# Patient Record
Sex: FEMALE | Race: WHITE | Employment: OTHER | URBAN - METROPOLITAN AREA
[De-identification: names, ages, dates, MRNs, and addresses within clinical notes are randomized per-mention and may not be internally consistent; named-entity substitution may affect disease eponyms.]

---

## 2018-06-04 PROBLEM — N95.9 POSTMENOPAUSAL SYMPTOMS: Status: ACTIVE | Noted: 2018-06-04

## 2018-06-05 PROBLEM — R73.01 IFG (IMPAIRED FASTING GLUCOSE): Status: ACTIVE | Noted: 2018-06-05

## 2022-06-04 ENCOUNTER — APPOINTMENT (OUTPATIENT)
Dept: GENERAL RADIOLOGY | Facility: CLINIC | Age: 77
End: 2022-06-04
Payer: MEDICARE

## 2022-06-04 ENCOUNTER — HOSPITAL ENCOUNTER (EMERGENCY)
Facility: CLINIC | Age: 77
Discharge: HOME | End: 2022-06-04
Attending: EMERGENCY MEDICINE
Payer: MEDICARE

## 2022-06-04 VITALS
DIASTOLIC BLOOD PRESSURE: 98 MMHG | RESPIRATION RATE: 16 BRPM | WEIGHT: 250 LBS | SYSTOLIC BLOOD PRESSURE: 179 MMHG | HEIGHT: 63 IN | BODY MASS INDEX: 44.3 KG/M2 | HEART RATE: 70 BPM | OXYGEN SATURATION: 94 % | TEMPERATURE: 98.5 F

## 2022-06-04 DIAGNOSIS — M79.604 RIGHT LEG PAIN: Primary | ICD-10-CM

## 2022-06-04 DIAGNOSIS — M25.561 ACUTE PAIN OF RIGHT KNEE: ICD-10-CM

## 2022-06-04 PROCEDURE — 73562 X-RAY EXAM OF KNEE 3: CPT

## 2022-06-04 PROCEDURE — 99283 EMERGENCY DEPT VISIT LOW MDM: CPT

## 2022-06-04 PROCEDURE — 73502 X-RAY EXAM HIP UNI 2-3 VIEWS: CPT

## 2022-06-04 ASSESSMENT — PAIN DESCRIPTION - ONSET: ONSET: ON-GOING

## 2022-06-04 ASSESSMENT — PAIN - FUNCTIONAL ASSESSMENT: PAIN_FUNCTIONAL_ASSESSMENT: 0-10

## 2022-06-04 ASSESSMENT — PAIN DESCRIPTION - FREQUENCY: FREQUENCY: CONTINUOUS

## 2022-06-04 NOTE — ED NOTES
Patient to ED via self wheeled to room 12  Here for complaint of right knee pain radiating up to right hip  Patient states it has been bothering her for about a week now since she was sitting on a folding chair.  Patient states she stood up out of a chair today and felt a pop in her right knee which is what brought her in  Denies any other injury  Denies CP, SOB, or N/V    Vitals obtained and call light provided  Patient resting comfortably on stretcher in no apparent distress  Respirations even and non-labored  Lacie León NP at bedside to evaluate patient     Raymond Rm RN  06/04/22 0817

## 2022-06-04 NOTE — ED PROVIDER NOTES
[facility] ED  [address] [facility]  Phone: [phone]      eMERGENCY dEPARTMENT eNCOUnter      Pt Name: Gloria Candelaria  MRN: [id number]  [place] 1945  Date of evaluation: 6/4/22      CHIEF COMPLAINT:  Chief Complaint   Patient presents with    Knee Pain    Leg Pain    Hip Pain     HISTORY OF PRESENT ILLNESS    lGoria Candelaria is a 68 y.o. female who presents emergency room today with complaints of right leg pain. She reports that she has been having intermittent right leg pain since last Saturday. She denies specific injury although states that she was at a party sitting in a folding chair that was on a slant and she feels as if she had to brace herself with using her right leg. She states that she remembers having right knee pain after getting up from the chair. She states that the pain radiates from her right knee up into her right thigh and right hip and down into her right anterior lower leg. She states she has been using Salonpas patches, acetaminophen and applying ice with minimal relief. She denies any paresthesias or focal weakness. She denies any right calf tenderness. Nursing Notes were reviewed. REVIEW OF SYSTEMS       Constitutional: Denies recent fever, chills. Eyes: No vision changes. Neck: No neck pain. Respiratory: Denies recent shortness of breath. Cardiac:  Denies recent chest pain. GI:  Denies abdominal pain/nausea/vomiting/diarrhea. : Denies dysuria. Musculoskeletal: Complaining of right lower extremity pain  Neurologic:  No headache. No focal weakness. No paresthesias. Skin:  Denies any rash. Negative in 10 essential Systems except as mentioned above and in the HPI. PAST MEDICAL HISTORY   PMH:  has a past medical history of Hypertension and Sleep apnea. Surgical History:  has a past surgical history that includes Hysterectomy (2006) and Cholecystectomy (2006). Social History:  reports that she has never smoked.  She has never used smokeless tobacco. She reports that she does not drink alcohol and does not use drugs. Family History: None  Psychiatric History: None    Allergies:is allergic to ace inhibitors and codeine. PHYSICAL EXAM     INITIAL VITALS: BP (!) 179/98   Pulse 70   Temp 98.5 °F (36.9 °C) (Oral)   Resp 16   Ht 5' 3\" (1.6 m)   Wt 113.4 kg (250 lb)   SpO2 94%   BMI 44.29 kg/m²   Constitutional:  Well developed   Eyes:  Pupils equal/round  HENT:  Atraumatic, external ears normal, nose normal  Respiratory:  LCTA bilat, no W/R/R  Cardiovascular:  RRR with normal S1 and S2    Musculoskeletal: No gross deformity/ecchymosis/erythema or bony point tenderness, range of motion intact, DP/PT pulses intact, NV intact distally. No signs of acute limb ischemia. Back:  No midline or CVA tenderness. Normal to inspection. Integument:   No rash. Neurologic:  Alert & appropriate mentation/interaction, no focal deficits noted       DIAGNOSTIC RESULTS     EKG: All EKG's are interpreted by the Emergency Department Physician who either signs or Co-signs this chart in the absence of a cardiologist.  Not indicated    RADIOLOGY:   Reviewed the radiologist:  XR KNEE RIGHT (3 VIEWS)   Final Result   No acute osseous abnormality. XR HIP RIGHT (2-3 VIEWS)   Final Result   No acute osseous abnormality. XR HIP RIGHT (2-3 VIEWS)    Result Date: 6/4/2022  EXAMINATION: TWO XRAY VIEWS OF THE RIGHT HIP; THREE XRAY VIEWS OF THE RIGHT KNEE 6/4/2022 11:03 am COMPARISON: None. HISTORY: ORDERING SYSTEM PROVIDED HISTORY: right hip pain TECHNOLOGIST PROVIDED HISTORY: right hip pain Reason for Exam: Pt.  States she has had pain to right knee extending up to right hip at times x 1 week. Today when she stood up she felt a \"pop\" in right knee and has increased pain.   No known injury.  ; ORDERING SYSTEM PROVIDED HISTORY: right knee pain TECHNOLOGIST PROVIDED HISTORY: right knee pain Reason for Exam: Pt.  States she has had pain to right knee extending up to right hip at times x 1 week. Today when she stood up she felt a \"pop\" in right knee and has increased pain. No known injury. RIGHT HIP AND RIGHT KNEE FINDINGS: There is no evidence of acute fracture. There is normal alignment. No acute joint abnormality. No focal osseous lesion. No focal soft tissue abnormality. No acute osseous abnormality. XR KNEE RIGHT (3 VIEWS)    Result Date: 6/4/2022  EXAMINATION: TWO XRAY VIEWS OF THE RIGHT HIP; THREE XRAY VIEWS OF THE RIGHT KNEE 6/4/2022 11:03 am COMPARISON: None. HISTORY: ORDERING SYSTEM PROVIDED HISTORY: right hip pain TECHNOLOGIST PROVIDED HISTORY: right hip pain Reason for Exam: Pt.  States she has had pain to right knee extending up to right hip at times x 1 week. Today when she stood up she felt a \"pop\" in right knee and has increased pain. No known injury.  ; ORDERING SYSTEM PROVIDED HISTORY: right knee pain TECHNOLOGIST PROVIDED HISTORY: right knee pain Reason for Exam: Pt.  States she has had pain to right knee extending up to right hip at times x 1 week. Today when she stood up she felt a \"pop\" in right knee and has increased pain. No known injury. RIGHT HIP AND RIGHT KNEE FINDINGS: There is no evidence of acute fracture. There is normal alignment. No acute joint abnormality. No focal osseous lesion. No focal soft tissue abnormality. No acute osseous abnormality. LABS:  Labs Reviewed - No data to display  Not indicated    EMERGENCY DEPARTMENT COURSE / MDM:     Patient presents emergency room today with complaints as described above. Vital signs reveal that she is slightly hypertensive at 179/98 although does appear in moderate pain distress. Plan is to obtain x-ray of right hip and right knee to rule out acute osseous abnormalities. Reveals no acute osseous abnormality of the right hip for right knee. I have reviewed the disposition diagnosis with the patient and or their family/guardian.   I have answered their questions and given discharge instructions. They voiced understanding of these instructions and did not have any further questions or complaints. We did discuss rest, ice, elevation and acetaminophen/ibuprofen for discomfort. I encouraged her to follow up with PCP for continued complaints. The patient presented with right knee pain. A fracture was not detected on X-ray. The hip and ankle joints were not affected and the foot is stable on exam. No skin lesions were seen. There are no signs of compartment syndrome. The pulses are 2/4. The motor is 5/5. The sensation is intact. The pt was advised to return to the Emergency Department for increasing pain, numbness, weakness, or coldness to the extremity. The pt was further instructed to follow up in 2-3 days with their family doctor or orthopedics. Pt voiced understanding of instructions. Pt presented with right hip pain. A fracture was not detected on X-ray. The knee joint was not affected and is stable. The sacrum and back are not painful or tender on exam. No skin lesions were seen. There are no signs of compartment syndrome. The pulses are 2/4. The motor is 5/5. The sensation is intact. The pt was advised to return to the Emergency Department for increasing pain, numbness, weakness, or coldness to the extremity. The pt was further instructed to follow up in 2-3 days with their family doctor or orthopedics. Pt voiced understanding of instructions. The patient understands that at this time there is no evidence for a more malignant underlying process, but the patient also understands that early in the process of an illness or injury, an emergency department workup can be falsely reassuring. Routine discharge counseling was given, and the patient understands that worsening, changing or persistent symptoms should prompt an immediate call or follow up with their primary physician or return to the emergency department.  The importance of appropriate follow up was also discussed. No orders of the defined types were placed in this encounter. CONSULTS:  None      FINAL IMPRESSION      1. Right leg pain    2.  Acute pain of right knee          DISPOSITION/PLAN:  DISPOSITION Decision To Discharge 06/04/2022 02:38:51 PM        PATIENT REFERRED TO:  Katia Chan 647     Call in 2 days      [facility]  [address]  [phone]    As needed      [address]:  New Prescriptions    No medications on file       (Please note that portions of this note were completed with a voice recognition program.  Efforts were made to edit the dictations but occasionally words are mis-transcribed.)    CORNELIUS Carrion - CORNELIUS Hickman CNP  06/04/22 [id number]

## 2023-04-25 PROBLEM — F33.1 MAJOR DEPRESSIVE DISORDER, RECURRENT, MODERATE (HCC): Status: ACTIVE | Noted: 2023-04-25

## 2024-03-12 ENCOUNTER — HOSPITAL ENCOUNTER (OUTPATIENT)
Age: 79
Setting detail: SPECIMEN
Discharge: HOME | End: 2024-03-12

## 2024-03-12 ENCOUNTER — HOSPITAL ENCOUNTER (OUTPATIENT)
Dept: GENERAL RADIOLOGY | Facility: CLINIC | Age: 79
Discharge: HOME | End: 2024-03-14
Payer: MEDICARE

## 2024-03-12 DIAGNOSIS — R07.89 ATYPICAL CHEST PAIN: ICD-10-CM

## 2024-03-12 DIAGNOSIS — M54.6 ACUTE LEFT-SIDED THORACIC BACK PAIN: ICD-10-CM

## 2024-03-12 LAB
ALBUMIN SERPL-MCNC: 4.4 G/DL (ref 3.5–5.2)
ALBUMIN/GLOB SERPL: 2 {RATIO} (ref 1–2.5)
ALP SERPL-CCNC: 118 U/L (ref 35–104)
ALT SERPL-CCNC: 17 U/L (ref 10–35)
ANION GAP SERPL CALCULATED.3IONS-SCNC: 13 MMOL/L (ref 9–16)
AST SERPL-CCNC: 19 U/L (ref 10–35)
BASOPHILS # BLD: 0.05 K/UL (ref 0–0.2)
BASOPHILS NFR BLD: 1 % (ref 0–2)
BILIRUB SERPL-MCNC: 0.6 MG/DL (ref 0–1.2)
BUN SERPL-MCNC: 19 MG/DL (ref 8–23)
CALCIUM SERPL-MCNC: 9.5 MG/DL (ref 8.6–10.4)
CHLORIDE SERPL-SCNC: 101 MMOL/L (ref 98–107)
CO2 SERPL-SCNC: 27 MMOL/L (ref 20–31)
CREAT SERPL-MCNC: 0.9 MG/DL (ref 0.5–0.9)
D DIMER PPP FEU-MCNC: 0.43 UG/ML FEU (ref 0–0.57)
EOSINOPHIL # BLD: 0.15 K/UL (ref 0–0.44)
EOSINOPHILS RELATIVE PERCENT: 2 % (ref 1–4)
ERYTHROCYTE [DISTWIDTH] IN BLOOD BY AUTOMATED COUNT: 14 % (ref 11.8–14.4)
GFR SERPL CREATININE-BSD FRML MDRD: >60 ML/MIN/1.73M2
GLUCOSE SERPL-MCNC: 110 MG/DL (ref 74–99)
HCT VFR BLD AUTO: 45.6 % (ref 36.3–47.1)
HGB BLD-MCNC: 14.8 G/DL (ref 11.9–15.1)
IMM GRANULOCYTES # BLD AUTO: 0.03 K/UL (ref 0–0.3)
IMM GRANULOCYTES NFR BLD: 0 %
LYMPHOCYTES NFR BLD: 1.31 K/UL (ref 1.1–3.7)
LYMPHOCYTES RELATIVE PERCENT: 18 % (ref 24–43)
MCH RBC QN AUTO: 31.8 PG (ref 25.2–33.5)
MCHC RBC AUTO-ENTMCNC: 32.5 G/DL (ref 28.4–34.8)
MCV RBC AUTO: 97.9 FL (ref 82.6–102.9)
MONOCYTES NFR BLD: 0.69 K/UL (ref 0.1–1.2)
MONOCYTES NFR BLD: 9 % (ref 3–12)
NEUTROPHILS NFR BLD: 70 % (ref 36–65)
NEUTS SEG NFR BLD: 5.16 K/UL (ref 1.5–8.1)
NRBC BLD-RTO: 0 PER 100 WBC
PLATELET # BLD AUTO: 248 K/UL (ref 138–453)
PMV BLD AUTO: 11.5 FL (ref 8.1–13.5)
POTASSIUM SERPL-SCNC: 4.2 MMOL/L (ref 3.7–5.3)
PROT SERPL-MCNC: 6.5 G/DL (ref 6.6–8.7)
RBC # BLD AUTO: 4.66 M/UL (ref 3.95–5.11)
SODIUM SERPL-SCNC: 141 MMOL/L (ref 136–145)
TROPONIN I SERPL HS-MCNC: 12 NG/L (ref 0–14)
WBC OTHER # BLD: 7.4 K/UL (ref 3.5–11.3)

## 2024-03-12 PROCEDURE — 72072 X-RAY EXAM THORAC SPINE 3VWS: CPT

## 2024-03-12 PROCEDURE — 71046 X-RAY EXAM CHEST 2 VIEWS: CPT

## 2024-04-12 ENCOUNTER — HOSPITAL ENCOUNTER (OUTPATIENT)
Age: 79
End: 2024-04-12
Payer: MEDICARE

## 2024-04-12 VITALS — WEIGHT: 252 LBS | HEIGHT: 62 IN | BODY MASS INDEX: 46.38 KG/M2

## 2024-04-12 DIAGNOSIS — R07.89 ATYPICAL CHEST PAIN: ICD-10-CM

## 2024-04-12 LAB
ECHO AO ROOT DIAM: 2.9 CM
ECHO AO ROOT INDEX: 1.37 CM/M2
ECHO AR MAX VEL PISA: 4.2 M/S
ECHO AV MEAN GRADIENT: 5 MMHG
ECHO AV MEAN VELOCITY: 1.1 M/S
ECHO AV PEAK GRADIENT: 10 MMHG
ECHO AV PEAK VELOCITY: 1.6 M/S
ECHO AV REGURGITANT PHT: 702 MS
ECHO AV VELOCITY RATIO: 0.75
ECHO AV VTI: 43.1 CM
ECHO BSA: 2.24 M2
ECHO EST RA PRESSURE: 3 MMHG
ECHO LA AREA 2C: 24.4 CM2
ECHO LA AREA 4C: 23.6 CM2
ECHO LA DIAMETER INDEX: 1.8 CM/M2
ECHO LA DIAMETER: 3.8 CM
ECHO LA MAJOR AXIS: 6.6 CM
ECHO LA MINOR AXIS: 6.9 CM
ECHO LA TO AORTIC ROOT RATIO: 1.31
ECHO LA VOL BP: 71 ML (ref 22–52)
ECHO LA VOL MOD A2C: 72 ML (ref 22–52)
ECHO LA VOL MOD A4C: 68 ML (ref 22–52)
ECHO LA VOL/BSA BIPLANE: 34 ML/M2 (ref 16–34)
ECHO LA VOLUME INDEX MOD A2C: 34 ML/M2 (ref 16–34)
ECHO LA VOLUME INDEX MOD A4C: 32 ML/M2 (ref 16–34)
ECHO LV E' LATERAL VELOCITY: 10 CM/S
ECHO LV E' SEPTAL VELOCITY: 6 CM/S
ECHO LV FRACTIONAL SHORTENING: 28 % (ref 28–44)
ECHO LV INTERNAL DIMENSION DIASTOLE INDEX: 1.85 CM/M2
ECHO LV INTERNAL DIMENSION DIASTOLIC: 3.9 CM (ref 3.9–5.3)
ECHO LV INTERNAL DIMENSION SYSTOLIC INDEX: 1.33 CM/M2
ECHO LV INTERNAL DIMENSION SYSTOLIC: 2.8 CM
ECHO LV IVSD: 1 CM (ref 0.6–0.9)
ECHO LV MASS 2D: 122.1 G (ref 67–162)
ECHO LV MASS INDEX 2D: 57.9 G/M2 (ref 43–95)
ECHO LV POSTERIOR WALL DIASTOLIC: 1 CM (ref 0.6–0.9)
ECHO LV RELATIVE WALL THICKNESS RATIO: 0.51
ECHO LVOT PEAK GRADIENT: 5 MMHG
ECHO LVOT PEAK VELOCITY: 1.2 M/S
ECHO MV A VELOCITY: 0.97 M/S
ECHO MV E DECELERATION TIME (DT): 225 MS
ECHO MV E VELOCITY: 0.7 M/S
ECHO MV E/A RATIO: 0.72
ECHO MV E/E' LATERAL: 7
ECHO MV E/E' RATIO (AVERAGED): 9.33
ECHO RIGHT VENTRICULAR SYSTOLIC PRESSURE (RVSP): 26 MMHG
ECHO TV REGURGITANT MAX VELOCITY: 2.38 M/S
ECHO TV REGURGITANT PEAK GRADIENT: 23 MMHG

## 2024-04-12 PROCEDURE — 93306 TTE W/DOPPLER COMPLETE: CPT

## 2024-04-23 ENCOUNTER — HOSPITAL ENCOUNTER (OUTPATIENT)
Dept: MAMMOGRAPHY | Age: 79
Discharge: HOME | End: 2024-04-25
Payer: MEDICARE

## 2024-04-23 VITALS — HEIGHT: 62 IN | WEIGHT: 252 LBS | BODY MASS INDEX: 46.38 KG/M2

## 2024-04-23 DIAGNOSIS — Z12.31 VISIT FOR SCREENING MAMMOGRAM: ICD-10-CM

## 2024-04-23 PROCEDURE — 77063 BREAST TOMOSYNTHESIS BI: CPT

## 2024-07-12 ENCOUNTER — HOSPITAL ENCOUNTER (OUTPATIENT)
Dept: VASCULAR LAB | Age: 79
End: 2024-07-12
Payer: MEDICARE

## 2024-07-12 DIAGNOSIS — R55 SYNCOPE, UNSPECIFIED SYNCOPE TYPE: ICD-10-CM

## 2024-07-12 LAB
VAS LEFT BULB EDV: 6 CM/S
VAS LEFT BULB PSV: 40.9 CM/S
VAS LEFT CCA DIST EDV: 12.4 CM/S
VAS LEFT CCA DIST PSV: 60.3 CM/S
VAS LEFT CCA PROX EDV: 18.9 CM/S
VAS LEFT CCA PROX PSV: 105.7 CM/S
VAS LEFT ECA EDV: 8.55 CM/S
VAS LEFT ECA PSV: 95.3 CM/S
VAS LEFT ICA DIST EDV: 21.5 CM/S
VAS LEFT ICA DIST PSV: 77.3 CM/S
VAS LEFT ICA PROX EDV: 21.5 CM/S
VAS LEFT ICA PROX PSV: 68.2 CM/S
VAS LEFT ICA/CCA PSV: 1.28 NO UNITS
VAS LEFT VERTEBRAL EDV: 9.85 CM/S
VAS LEFT VERTEBRAL PSV: 34.5 CM/S
VAS RIGHT BULB EDV: 10.9 CM/S
VAS RIGHT BULB PSV: 47.2 CM/S
VAS RIGHT CCA DIST EDV: 16.4 CM/S
VAS RIGHT CCA DIST PSV: 60.4 CM/S
VAS RIGHT CCA PROX EDV: 13.1 CM/S
VAS RIGHT CCA PROX PSV: 60.4 CM/S
VAS RIGHT ECA EDV: 8.69 CM/S
VAS RIGHT ECA PSV: 83.5 CM/S
VAS RIGHT ICA DIST EDV: 29.6 CM/S
VAS RIGHT ICA DIST PSV: 85.7 CM/S
VAS RIGHT ICA PROX EDV: 15.3 CM/S
VAS RIGHT ICA PROX PSV: 46.1 CM/S
VAS RIGHT ICA/CCA PSV: 1.4 NO UNITS
VAS RIGHT VERTEBRAL EDV: 7.59 CM/S
VAS RIGHT VERTEBRAL PSV: 39.5 CM/S

## 2024-07-12 PROCEDURE — 93880 EXTRACRANIAL BILAT STUDY: CPT

## 2024-07-24 ENCOUNTER — HOSPITAL ENCOUNTER (OUTPATIENT)
Dept: MRI IMAGING | Age: 79
Discharge: HOME | End: 2024-07-26
Payer: MEDICARE

## 2024-07-24 DIAGNOSIS — R55 SYNCOPE, UNSPECIFIED SYNCOPE TYPE: ICD-10-CM

## 2024-07-24 LAB
CREAT SERPL-MCNC: 0.8 MG/DL (ref 0.5–0.9)
GFR, ESTIMATED: 75 ML/MIN/1.73M2

## 2024-07-24 PROCEDURE — 70553 MRI BRAIN STEM W/O & W/DYE: CPT

## 2024-07-24 PROCEDURE — A9579 GAD-BASE MR CONTRAST NOS,1ML: HCPCS | Performed by: FAMILY MEDICINE

## 2024-07-24 PROCEDURE — 82565 ASSAY OF CREATININE: CPT

## 2024-07-24 PROCEDURE — 36415 COLL VENOUS BLD VENIPUNCTURE: CPT

## 2024-07-24 PROCEDURE — 6360000004 HC RX CONTRAST MEDICATION: Performed by: FAMILY MEDICINE

## 2024-07-24 RX ADMIN — GADOTERIDOL 20 ML: 279.3 INJECTION, SOLUTION INTRAVENOUS at 07:59
